# Patient Record
Sex: FEMALE | Race: AMERICAN INDIAN OR ALASKA NATIVE
[De-identification: names, ages, dates, MRNs, and addresses within clinical notes are randomized per-mention and may not be internally consistent; named-entity substitution may affect disease eponyms.]

---

## 2018-12-15 ENCOUNTER — HOSPITAL ENCOUNTER (INPATIENT)
Dept: HOSPITAL 42 - ED | Age: 33
LOS: 1 days | Discharge: HOME | DRG: 305 | End: 2018-12-16
Attending: INTERNAL MEDICINE | Admitting: INTERNAL MEDICINE
Payer: COMMERCIAL

## 2018-12-15 VITALS — BODY MASS INDEX: 37.2 KG/M2

## 2018-12-15 DIAGNOSIS — I16.1: ICD-10-CM

## 2018-12-15 DIAGNOSIS — I10: ICD-10-CM

## 2018-12-15 DIAGNOSIS — Z91.14: ICD-10-CM

## 2018-12-15 DIAGNOSIS — E66.01: ICD-10-CM

## 2018-12-15 DIAGNOSIS — I16.0: Primary | ICD-10-CM

## 2018-12-15 DIAGNOSIS — R51: ICD-10-CM

## 2018-12-15 DIAGNOSIS — Z98.84: ICD-10-CM

## 2018-12-15 LAB
ALBUMIN SERPL-MCNC: 4.1 G/DL (ref 3–4.8)
ALBUMIN/GLOB SERPL: 1.1 {RATIO} (ref 1.1–1.8)
ALT SERPL-CCNC: 20 U/L (ref 7–56)
APTT BLD: 25.9 SECONDS (ref 25.1–36.5)
AST SERPL-CCNC: 28 U/L (ref 14–36)
BASOPHILS # BLD AUTO: 0.02 K/MM3 (ref 0–2)
BASOPHILS NFR BLD: 0.3 % (ref 0–3)
BUN SERPL-MCNC: 18 MG/DL (ref 7–21)
CALCIUM SERPL-MCNC: 8.3 MG/DL (ref 8.4–10.5)
EOSINOPHIL # BLD: 0.1 10*3/UL (ref 0–0.7)
EOSINOPHIL NFR BLD: 1.3 % (ref 1.5–5)
ERYTHROCYTE [DISTWIDTH] IN BLOOD BY AUTOMATED COUNT: 17.4 % (ref 11.5–14.5)
GFR NON-AFRICAN AMERICAN: > 60
GRANULOCYTES # BLD: 4.65 10*3/UL (ref 1.4–6.5)
GRANULOCYTES NFR BLD: 65.3 % (ref 50–68)
HGB BLD-MCNC: 11 G/DL (ref 12–16)
INR PPP: 1.18
LYMPHOCYTES # BLD: 1.8 10*3/UL (ref 1.2–3.4)
LYMPHOCYTES NFR BLD AUTO: 24.7 % (ref 22–35)
MCH RBC QN AUTO: 23.1 PG (ref 25–35)
MCHC RBC AUTO-ENTMCNC: 30.9 G/DL (ref 31–37)
MCV RBC AUTO: 74.6 FL (ref 80–105)
MONOCYTES # BLD AUTO: 0.6 10*3/UL (ref 0.1–0.6)
MONOCYTES NFR BLD: 8.4 % (ref 1–6)
PLATELET # BLD: 212 10^3/UL (ref 120–450)
PROTHROMBIN TIME: 13.6 SECONDS (ref 9.4–12.5)
RBC # BLD AUTO: 4.77 10^6/UL (ref 3.5–6.1)
TROPONIN I SERPL-MCNC: < 0.01 NG/ML
WBC # BLD AUTO: 7.1 10^3/UL (ref 4.5–11)

## 2018-12-15 RX ADMIN — NICARDIPINE HYDROCHLORIDE PRN MLS/HR: 0.1 INJECTION, SOLUTION INTRAVENOUS at 15:52

## 2018-12-15 RX ADMIN — NICARDIPINE HYDROCHLORIDE PRN MLS/HR: 0.1 INJECTION, SOLUTION INTRAVENOUS at 12:50

## 2018-12-15 RX ADMIN — NICARDIPINE HYDROCHLORIDE PRN MLS/HR: 0.1 INJECTION, SOLUTION INTRAVENOUS at 12:24

## 2018-12-15 NOTE — CARD
--------------- APPROVED REPORT --------------





Date of service: 12/15/2018



EKG Measurement

Heart [id number]

[id number]

[id number]

[id number]

[id number]



<Conclusion>

Sinus tachycardia

Possible Left atrial enlargement

Borderline ECG

## 2018-12-15 NOTE — CP.PCM.CON
History of Present Illness





- History of Present Illness


History of Present Illness: 


MICU CONSULT NOTE





HPI


Patient is 34yo female with PMHx of obesity s/p gastric surgery, HTN, not on any

home meds, presents after being sent in from her PMDs office for elevated SBP 

~250 (as per the ER staff). Pt notes since yesterday she has had a headache, 

taken 2-3 Adivl pills, without any alleviation. 


Upon presentation to the ER pts /151, started on Cardene drip. Pt reports 

she took herself off Lisinipril few months ago. Denies CP, SOB, palpitations, 

dizziness, N/V, abd pain. No other constitutional symptoms. 





PMHx obesity, HTN


PSHx gastric surgery


Meds NONE


FHx NC


Social denies smoking, drug use;+ occasional etoh


Allergies NKDA





Review of Systems





- Review of Systems


Review of Systems: 





as per HPI





Past Patient History





- Infectious Disease


Hx of Infectious Diseases: None





- Tetanus Immunizations


Tetanus Immunization: Unknown





- Past Social History


Smoking Status: Never Smoked





- CARDIAC


Hx Cardiac Disorders: Yes


Hx Hypertension: Yes





- PULMONARY


Hx Respiratory Disorders: No





- NEUROLOGICAL


Hx Neurological Disorder: No





- HEENT


Hx HEENT Problems: No





- RENAL


Hx Chronic Kidney Disease: No





- ENDOCRINE/METABOLIC


Hx Endocrine Disorders: No





- HEMATOLOGICAL/ONCOLOGICAL


Hx Blood Disorders: No





- INTEGUMENTARY


Hx Dermatological Problems: No





- MUSCULOSKELETAL/RHEUMATOLOGICAL


Hx Musculoskeletal Disorders: No





- GASTROINTESTINAL


Hx Gastrointestinal Disorders: No





- GENITOURINARY/GYNECOLOGICAL


Hx Genitourinary Disorders: No





- PSYCHIATRIC


Hx Psychophysiologic Disorder: No


Hx Substance Use: No





- SURGICAL HISTORY


Hx  Section: Yes (x1)


Other/Comment: gastric sleeve





- ANESTHESIA


Hx Anesthesia: No


Hx Anesthesia Reactions: No


Hx Malignant Hyperthermia: No





Meds


Allergies/Adverse Reactions: 


                                    Allergies











Allergy/AdvReac Type Severity Reaction Status Date / Time


 


No Known Allergies Allergy   Verified 11/01/15 05:23














- Medications


Medications: 


                               Current Medications





Nicardipine HCl (Cardene Iv Premix)  20 mg in 200 mls @ 50 mls/hr IV .Q4H PRN; 

Protocol


   PRN Reason: TITRATE PER MD ORDER


   Last Admin: 12/15/18 15:52 Dose:  50 mls/hr


Lisinopril (Zestril)  10 mg PO DAILY [facility]











Physical Exam





- Constitutional


Appears: Non-toxic, No Acute Distress





- Head Exam


Head Exam: NORMAL INSPECTION





- Eye Exam


Eye Exam: Normal appearance





- ENT Exam


ENT Exam: Mucous Membranes Moist





- Neck Exam


Neck exam: Positive for: Full Rom





- Respiratory Exam


Respiratory Exam: Clear to Auscultation Bilateral, NORMAL BREATHING PATTERN





- Cardiovascular Exam


Cardiovascular Exam: REGULAR RHYTHM, +S1, +S2





- GI/Abdominal Exam


GI & Abdominal Exam: Normal Bowel Sounds, Soft





- Back Exam


Back exam: NORMAL INSPECTION





- Neurological Exam


Neurological exam: Alert, Oriented x3





- Psychiatric Exam


Psychiatric exam: Normal Affect





- Skin


Skin Exam: Normal Color, Warm





Results





- Vital Signs


Recent Vital Signs: 


                                Last Vital Signs











Temp  99.5 F   12/15/18 11:33


 


Pulse  100 H  12/15/18 16:54


 


Resp  18   12/15/18 16:54


 


BP  164/103 H  12/15/18 16:54


 


Pulse Ox  100   12/15/18 16:54














- Labs


Result Diagrams: 


                                 12/15/18 12:00





                                 12/15/18 12:00


Labs: 


                         Laboratory Results - last 24 hr











  12/15/18 12/15/18 12/15/18





  12:00 12:00 12:00


 


WBC   7.1 


 


RBC   4.77 


 


Hgb   11.0 L 


 


Hct   35.6 L 


 


MCV   74.6 L 


 


MCH   23.1 L 


 


MCHC   30.9 L 


 


RDW   17.4 H 


 


Plt Count   212 


 


Gran %   65.3 


 


Lymph % (Auto)   24.7 


 


Mono % (Auto)   8.4 H 


 


Eos % (Auto)   1.3 L 


 


Baso % (Auto)   0.3 


 


Gran #   4.65 


 


Lymph # (Auto)   1.8 


 


Mono # (Auto)   0.6 


 


Eos # (Auto)   0.1 


 


Baso # (Auto)   0.02 


 


PT    13.6 H


 


INR    1.18


 


APTT    25.9


 


Sodium  139  


 


Potassium  3.5 L  


 


Chloride  104  


 


Carbon Dioxide  28  


 


Anion Gap  10  


 


BUN  18  


 


Creatinine  1.0  


 


Est GFR ( Amer)  > 60  


 


Est GFR (Non-Af Amer)  > 60  


 


Random Glucose  92  


 


Calcium  8.3 L  


 


Magnesium  1.9  


 


Total Bilirubin  0.4  


 


AST  28  


 


ALT  20  


 


Alkaline Phosphatase  95  


 


Troponin I  < 0.01  


 


Total Protein  7.8  


 


Albumin  4.1  


 


Globulin  3.7  


 


Albumin/Globulin Ratio  1.1  


 


TSH 3rd Generation   


 


Urine Opiates Screen   


 


Urine Methadone Screen   


 


Ur Barbiturates Screen   


 


Ur Phencyclidine Scrn   


 


Ur Amphetamines Screen   


 


U Benzodiazepines Scrn   


 


U Oth Cocaine Metabols   


 


U Cannabinoids Screen   














  12/15/18 12/15/18





  12:00 13:00


 


WBC  


 


RBC  


 


Hgb  


 


Hct  


 


MCV  


 


MCH  


 


MCHC  


 


RDW  


 


Plt Count  


 


Gran %  


 


Lymph % (Auto)  


 


Mono % (Auto)  


 


Eos % (Auto)  


 


Baso % (Auto)  


 


Gran #  


 


Lymph # (Auto)  


 


Mono # (Auto)  


 


Eos # (Auto)  


 


Baso # (Auto)  


 


PT  


 


INR  


 


APTT  


 


Sodium  


 


Potassium  


 


Chloride  


 


Carbon Dioxide  


 


Anion Gap  


 


BUN  


 


Creatinine  


 


Est GFR ( Amer)  


 


Est GFR (Non-Af Amer)  


 


Random Glucose  


 


Calcium  


 


Magnesium  


 


Total Bilirubin  


 


AST  


 


ALT  


 


Alkaline Phosphatase  


 


Troponin I  


 


Total Protein  


 


Albumin  


 


Globulin  


 


Albumin/Globulin Ratio  


 


TSH 3rd Generation  1.35 


 


Urine Opiates Screen   Negative


 


Urine Methadone Screen   Negative


 


Ur Barbiturates Screen   Negative


 


Ur Phencyclidine Scrn   Negative


 


Ur Amphetamines Screen   Negative


 


U Benzodiazepines Scrn   Negative


 


U Oth Cocaine Metabols   Negative


 


U Cannabinoids Screen   Negative














Assessment & Plan





- Assessment and Plan (Free Text)


Assessment: 





34yo female a/w HTN urgency








HTN Urgency


Obesity





Recommend:


- supp o2 as needed, duonebs PRN


- NO ID issues


- BP control, goal MAP reduction in first 24h by NO MORE than 25%


- cont Cardene drip, transition to PO meds


- ECHO


- repeat CE


- Low salt diet


- GI ppx


- DVT ppx


- Admit to CCU

## 2018-12-15 NOTE — CT
Date of service: 12/15/2018



PROCEDURE:  CT HEAD WITHOUT CONTRAST.



HISTORY:

headache, ICH



COMPARISON:

None available.



TECHNIQUE:

Axial computed tomography images were obtained through the head/brain 

without intravenous contrast.  



Radiation dose:



Total exam DLP = 858.8 mGy-cm.



This CT exam was performed using one or more of the following dose 

reduction techniques: Automated exposure control, adjustment of the 

mA and/or kV according to patient size, and/or use of iterative 

reconstruction technique.



FINDINGS:



HEMORRHAGE:

No intracranial hemorrhage. 



BRAIN:

No mass effect or edema.  No atrophy or chronic microvascular 

ischemic changes.



VENTRICLES:

No hydrocephalus. 



CALVARIUM:

Unremarkable.



PARANASAL SINUSES:

Unremarkable as visualized. No significant inflammatory changes.



MASTOID AIR CELLS:

Unremarkable as visualized. No inflammatory changes.



OTHER FINDINGS:

None.



IMPRESSION:

No acute intracranial pathology identified.

## 2018-12-15 NOTE — ED PDOC
Arrival/HPI





- General


Chief Complaint: High Blood Pressure


Time Seen by Provider: 12/15/18 11:54


Historian: Patient





- History of Present Illness


Narrative History of Present Illness (Text): 





12/15/18 11:57


33 f with pmhx of hypertension and a gastric sleeve surgery last year presents 

to the ED with high blood pressure earlier today. Patient states she is 

experiencing a headache. Patient reports she has not taken her medication for 

the past 6-7 months and was sent to the ER by Dr. Hernandez, who she saw earlier 

today. Patient states she has taken 2 pills of Aleve and 3 pills of Advil for 

the pain to no relief. Patient denies any chest pain, nausea, vomiting or any 

other complaints. 





PMD: Dr. Hernandez 





Time/Duration: 4-6 hours (earlier today)


Symptom Onset: Gradual


Activities at Onset: Light


Context: Other (Doctor's office)





Past Medical History





- Provider Review


Nursing Documentation Reviewed: Yes





- Infectious Disease


Hx of Infectious Diseases: None





- Tetanus Immunization


Tetanus Immunization: Unknown





- Cardiac


Hx Cardiac Disorders: Yes


Hx Hypertension: Yes





- Pulmonary


Hx Respiratory Disorders: No





- Neurological


Hx Neurological Disorder: No





- HEENT


Hx HEENT Disorder: No





- Renal


Hx Renal Disorder: No





- Endocrine/Metabolic


Hx Endocrine Disorders: No





- Hematological/Oncological


Hx Blood Disorders: No





- Integumentary


Hx Dermatological Disorder: No





- Musculoskeletal/Rheumatological


Hx Musculoskeletal Disorders: No





- Gastrointestinal


Hx Gastrointestinal Disorders: No





- Genitourinary/Gynecological


Hx Genitourinary Disorders: No





- Psychiatric


Hx Psychophysiologic Disorder: No


Hx Substance Use: No





- Past Surgical History


Past Surgical History: No Previous





- Surgical History


Hx  Section: Yes (x1)


Other/Comment: gastric sleeve





- Anesthesia


Hx Anesthesia: No


Hx Anesthesia Reactions: No


Hx Malignant Hyperthermia: No





- Suicidal Assessment


Feels Threatened In Home Enviroment: No





Family/Social History





- Physician Review


Nursing Documentation Reviewed: Yes


Family/Social History: No Known Family HX


Smoking Status: Never Smoked


Hx Alcohol Use: No


Hx Substance Use: No


Hx Substance Use Treatment: No





Allergies/Home Meds


Allergies/Adverse Reactions: 


Allergies





No Known Allergies Allergy (Verified 11/01/15 05:23)


   











Review of Systems





- Physician Review


All systems were reviewed & negative as marked: Yes





- Review of Systems


Cardiovascular: absent: Chest Pain


Gastrointestinal: absent: Nausea, Vomiting


Neurological: Headache





Physical Exam





- Physical Exam


Narrative Physical Exam (Text): 





12/15/18 12:00


Gen: VS reviewed, alert, well developed, well nourished, nontoxic, mild 

distress.


ENT: normal pharynx.


Eye: EOMI, PERRL.


Neck: no JVD, supple, no adenopathy.


CV: regular rate, regular rhythm, no rubs, no murmur, no gallops, S1, S2, pulses

equal and strong.


Pulm: no distress, clear to auscultation, no wheeze, no rhonchi, breath sounds 

equal, no rales.


Abd: soft, nontender, no guarding, no rebound, no rigidity, normal bowel sounds.


Ext: no edema.


Skin: good color, no rash, no cyanosis.


Psych: responds appropriately to questions, normal affect.


Neuro: oriented x 3, CN2-12 intact grossly, motor intact, sensation intact.








Vital Signs Reviewed: Yes





Vital Signs











  Temp Pulse Resp BP Pulse Ox


 


 12/15/18 11:33  99.5 F  104 H  18  229/151 H  97











Temperature: Afebrile


Blood Pressure: Hypertensive


Pulse: Tachycardic


Respiratory Rate: Normal


Appearance: Positive for: Well-Appearing, Non-Toxic





Medical Decision Making


ED Course and Treatment: 





12/15/18 15:28


Leaving Against Medical Advice (AMA):


The patient is choosing to leave against medical advice.  I have personally 

explained to the patient that choosing to do so may result in permanent bodily 

harm or death.  I have discussed at great length that without further evaluation

and monitoring there may be unforeseen circumstances and/or deterioration 

causing permanent bodily harm, disability or death as a result of their choice. 

The patient is alert, oriented, and shows the mental capacity to make clear 

decisions regarding the patients health care at this time. The patient 

continues to wish to leave against medical advice.  


In light of the patients decision to leave against medical advice, follow-up 

has been arranged and the patient is aware of the importance to following up as 

instructed.  The patient has been advised that they should return to the em

ergency room immediately if they change their mind at any time, or if their 

condition begins to change or worsen in any way.





12/15/18 15:55


for over the past hour i have been back and forth explaining to the patient the 

severity of her illness and its critical nature. i have even had a discussion 

with the patient's godmother to attempt to convince patient to stay. at this 

time, the patient's godmother is coming to the ED to assist talking the patient 

to stay for admission. in the meantime, the patient's blood pressure has 

progessively escalated back to critical level- will retart cardene drip.





12/15/18 16:40


Patient has decided to stay.





12/15/18 16:58


admit accepted by dr. rivera, patient to be admitted for severe hypertension 

requiring IV titrateable medication. awaiting phone call back from icu for 

admission/disposition.


12/15/18 17:14


case discussed with dr. felix, intensivist, will see pt in consultation





- Critical Care


Critical Care Minutes: 30 minutes (critical care for critical illness, multiple 

bedside evaluations and management updates to the patient)





- RAD Interpretation


Narrative RAD Interpretations (Text): 





12/15/18 14:11


Procedure: Head CT without contrast


Time:  12/15/2018 13:52:11


Dictator: Cassi Bajwa MD


Impression: No acute intracranial pathology identified.








: Radiologist





- EKG Interpretation


EKG Interpretation (Text): 





12/15/18 16:37


1149: sinus tachycardia at 101 bpm, nml qrs, nml axis, lvh, nonspecific t wave 

abn


Interpreted by ED Physician: Yes





- Scribe Statement


The provider has reviewed the documentation as recorded by the Jesus Olvera





All medical record entries made by the Scribe were at my direction and 

personally dictated by me. I have reviewed the chart and agree that the record 

accurately reflects my personal performance of the history, physical exam, 

medical decision making, and the department course for this patient. I have also

personally directed, reviewed, and agree with the discharge instructions and 

disposition.








Disposition/Present on Arrival





- Present on Arrival


History of DVT/PE: No


History of Uncontrolled Diabetes: No


Urinary Catheter: No


History of Decub. Ulcer: No


History Surgical Site Infection Following: None





- Disposition


Forms:  [facility] (English)

## 2018-12-16 VITALS — TEMPERATURE: 98 F | RESPIRATION RATE: 16 BRPM

## 2018-12-16 VITALS — HEART RATE: 92 BPM | DIASTOLIC BLOOD PRESSURE: 86 MMHG | OXYGEN SATURATION: 100 % | SYSTOLIC BLOOD PRESSURE: 154 MMHG

## 2018-12-16 LAB
ALBUMIN SERPL-MCNC: 3.8 G/DL (ref 3–4.8)
ALBUMIN/GLOB SERPL: 1 {RATIO} (ref 1.1–1.8)
ALT SERPL-CCNC: 25 U/L (ref 7–56)
AST SERPL-CCNC: 27 U/L (ref 14–36)
BASOPHILS # BLD AUTO: 0.02 K/MM3 (ref 0–2)
BASOPHILS NFR BLD: 0.3 % (ref 0–3)
BUN SERPL-MCNC: 13 MG/DL (ref 7–21)
CALCIUM SERPL-MCNC: 8.5 MG/DL (ref 8.4–10.5)
EOSINOPHIL # BLD: 0.1 10*3/UL (ref 0–0.7)
EOSINOPHIL NFR BLD: 1.1 % (ref 1.5–5)
ERYTHROCYTE [DISTWIDTH] IN BLOOD BY AUTOMATED COUNT: 17.4 % (ref 11.5–14.5)
GFR NON-AFRICAN AMERICAN: > 60
GRANULOCYTES # BLD: 4.65 10*3/UL (ref 1.4–6.5)
GRANULOCYTES NFR BLD: 65.7 % (ref 50–68)
HDLC SERPL-MCNC: 51 MG/DL (ref 29–60)
HGB BLD-MCNC: 11.8 G/DL (ref 12–16)
LDLC SERPL-MCNC: 111 MG/DL (ref 0–129)
LYMPHOCYTES # BLD: 1.9 10*3/UL (ref 1.2–3.4)
LYMPHOCYTES NFR BLD AUTO: 26.1 % (ref 22–35)
MCH RBC QN AUTO: 23.3 PG (ref 25–35)
MCHC RBC AUTO-ENTMCNC: 31.4 G/DL (ref 31–37)
MCV RBC AUTO: 74.3 FL (ref 80–105)
MONOCYTES # BLD AUTO: 0.5 10*3/UL (ref 0.1–0.6)
MONOCYTES NFR BLD: 6.8 % (ref 1–6)
PLATELET # BLD: 208 10^3/UL (ref 120–450)
RBC # BLD AUTO: 5.06 10^6/UL (ref 3.5–6.1)
T4 FREE SERPL-MCNC: 1.47 NG/DL (ref 0.78–2.19)
WBC # BLD AUTO: 7.1 10^3/UL (ref 4.5–11)

## 2018-12-16 RX ADMIN — NICARDIPINE HYDROCHLORIDE PRN MLS/HR: 0.1 INJECTION, SOLUTION INTRAVENOUS at 00:48

## 2018-12-16 NOTE — HP
DATE OF EXAM:  12/15/2018



HISTORY OF PRESENT ILLNESS:  Patient is a 33-year-old with history of

hypertension, very noncompliant with medication.  She went to see Dr. Hernandez, who take her blood pressure, was found to be 225/150.  Per Dr. Hernandez's office called ambulance and she was brought to the emergency room. 

She has been started on Cardizem drip.  Patient used to be on lisinopril,

but she stopped taking a couple of months ago.  Has headache with some

chest pressure.



PAST MEDICAL HISTORY:  Significant for:

1.  Gastric bypass surgery.

2.  Hypertension.



ALLERGIES:  SHE IS NOT ALLERGIC TO ANY MEDICATIONS.



MEDICATIONS AT HOME:  Currently, she is not on any medications.



SOCIAL HISTORY:  Denies smoking,drinking, alcohol use.



PHYSICAL EXAMINATION

GENERAL:  She is awake, alert, oriented, communicative, not in any

distress.

VITAL SIGNS:  She is afebrile, pulse 106, respirations 18, blood pressure

165/92.

LUNGS:  Bilateral fair airflow.  No rhonchi or crackles.

HEART:  S1 and S2 audible.

ABDOMEN:  Soft, obese, nontender.  No rebound.  No guarding.

NEUROLOGIC:  Patient is awake, alert, oriented, communicative, moves all

extremities.



LABORATORY DATA:  WBC 7.1, hemoglobin 11, hematocrit 35.6, and platelets

212.  PT 13.6.  INR 1.18.  Chemistry; sodium 139, potassium 3.5, chloride

104, CO2 of 28, BUN 18, creatinine 1.0.  Blood sugar of 92.  Urine tox is

negative.



ASSESSMENT:

1.  Uncontrolled hypertension.

2.  Hypertensive emergency.



PLAN:  Patient is being admitted in ICU.  She will be tapered off of

Cardizem drip.  We will followup on electrolytes.  Follow up CBC, CMP,

thyroid profile, and lipid profile in the a.m.







__________________________________________

Beau Forde MD





DD:  12/15/2018 19:01:53

DT:  12/15/2018 20:46:18

Job # [id number]

## 2018-12-16 NOTE — CP.PCM.PN
Subjective





- Date & Time of Evaluation


Date of Evaluation: 12/16/18


Time of Evaluation: 08:00





- Subjective


Subjective: 





Patient seen and examined, no major complaints. BP improved, OFF Cardene drip. 





Objective





- Vital Signs/Intake and Output


Vital Signs (last 24 hours): 


                                        











Temp Pulse Resp BP Pulse Ox


 


 99.5 F   90   12   164/75 H  99 


 


 12/15/18 11:33  12/16/18 10:48  12/16/18 03:00  12/16/18 10:48  12/16/18 03:00








Intake and Output: 


                                        











 12/16/18 12/16/18





 06:59 18:59


 


Intake Total 100 


 


Balance 100 














- Medications


Medications: 


                               Current Medications





Amlodipine Besylate (Norvasc)  10 mg PO DAILY [facility]


   Last Admin: 12/16/18 10:47 Dose:  10 mg


Nicardipine HCl (Cardene Iv Premix)  20 mg in 200 mls @ 50 mls/hr IV .Q4H PRN; 

Protocol


   PRN Reason: TITRATE PER MD ORDER


   Last Titration: 12/16/18 04:00 Dose:  0 mg/hr, 0 mls/hr


Lisinopril (Zestril)  10 mg PO DAILY [facility]


   Last Admin: 12/16/18 10:48 Dose:  10 mg


Potassium Chloride (K-Dur 20 Meq Er Tab)  40 meq PO ONCE ONE


   Stop: 12/16/18 13:01











- Labs


Labs: 


                                        





                                 12/16/18 06:00 





                                 12/16/18 06:00 





                                        











PT  13.6 SECONDS (9.4-12.5)  H  12/15/18  12:00    


 


INR  1.18   12/15/18  12:00    


 


APTT  25.9 Seconds (25.1-36.5)   12/15/18  12:00    














- Constitutional


Appears: Non-toxic, No Acute Distress





- Head Exam


Head Exam: NORMAL INSPECTION





- Eye Exam


Eye Exam: Normal appearance





- ENT Exam


ENT Exam: Mucous Membranes Moist





- Respiratory Exam


Respiratory Exam: Clear to Ausculation Bilateral, NORMAL BREATHING PATTERN





- Cardiovascular Exam


Cardiovascular Exam: REGULAR RHYTHM, +S1, +S2





- GI/Abdominal Exam


GI & Abdominal Exam: Soft, Normal Bowel Sounds





- Extremities Exam


Extremities Exam: Full ROM, Normal Inspection





- Neurological Exam


Neurological Exam: Alert, Awake, Oriented x3





Assessment and Plan





- Assessment and Plan (Free Text)


Assessment: 





34yo female a/w HTN urgency








HTN Urgency


Obesity





- currently afebrile, -160, comfortable in NAD, no major complaints, OFF 

Cardene drip





Recommend:


- supp o2 as needed, duonebs PRN


- NO ID issues


- BP control, goal MAP reduction in first 24h by NO MORE than 25%


- Lisinopril, Norvasc


- ECHO


- Low salt diet


- GI ppx


- DVT ppx


- Stable, transfer to telemetry

## 2018-12-17 NOTE — DS
HISTORY OF PRESENT ILLNESS:  The patient is 33 years old seen and examined.

The patient was seen by Dr. Hernandez yesterday.  She was found to have blood

pressure of 225/150.  Ambulance was called, the patient was brought to ER. 

Her blood pressure was above 200 in the ER.  Also she was started on

Cardizem drip, admitted in ICU.  She was given p.o. Norvasc and lisinopril

and her Cardizem drip was tapered down.  I saw the patient this morning,

she was anxious to go home.  She states there is no way she could stay. 

She was given dose of Norvasc and lisinopril.  Her blood pressure was still

running high; she was given another dose of clonidine and follow up blood

pressure around 06:00 is 154/86.  The patient is anxious to go home.  She

states she is going to sign against medical advice if she is not

discharged.



PHYSICAL EXAMINATION:

GENERAL:  She is awake, alert, oriented, communicative.

VITAL SIGNS:  She is afebrile, pulse 92, respirations 16, blood pressure

154/86.

LUNGS:  Bilateral fair airflow.  No rhonchi or crackle.

HEART:  S1 and S2 audible.

ABDOMEN:  Soft and nontender.  No rebound.  No guarding.

NEUROLOGIC:  The patient is awake, alert, oriented, communicative.



LABORATORY DATA:  WBC 7.1, hemoglobin 11.8, hematocrit 37.6, platelets 208.

Chemistry; sodium 136, potassium 3.0, chloride 102, CO2 28, BUN 13,

creatinine 0.9, blood sugar of 92.



ASSESSMENT:

1.  Uncontrolled hypertension.

2.  Headache.

3.  Morbid obesity.

4.  Status post gastric sleeve.



PLAN:  The patient was given prescription on Norvasc 10 mg daily.  She was

given lisinopril 10 mg.  I advised the patient to stay another night, but

she states she has a little baby; she has to get out and get him.  She is

advised to follow up with Dr. Hernandez in a.m. to follow up her blood pressure

and adjust her medication.







__________________________________________

Beau Forde MD





DD:  12/16/2018 18:43:57

DT:  12/17/2018 5:29:22

[facility] # [id number]